# Patient Record
Sex: MALE | Employment: FULL TIME | URBAN - METROPOLITAN AREA
[De-identification: names, ages, dates, MRNs, and addresses within clinical notes are randomized per-mention and may not be internally consistent; named-entity substitution may affect disease eponyms.]

---

## 2019-11-19 ENCOUNTER — OFFICE VISIT (OUTPATIENT)
Dept: FAMILY MEDICINE | Facility: CLINIC | Age: 29
End: 2019-11-19
Payer: COMMERCIAL

## 2019-11-19 VITALS
DIASTOLIC BLOOD PRESSURE: 71 MMHG | HEART RATE: 72 BPM | BODY MASS INDEX: 24.1 KG/M2 | HEIGHT: 68 IN | WEIGHT: 159 LBS | SYSTOLIC BLOOD PRESSURE: 115 MMHG | TEMPERATURE: 98.4 F | OXYGEN SATURATION: 99 % | RESPIRATION RATE: 16 BRPM

## 2019-11-19 DIAGNOSIS — D22.9 MULTIPLE ATYPICAL SKIN MOLES: Primary | ICD-10-CM

## 2019-11-19 DIAGNOSIS — L30.9 ECZEMA, UNSPECIFIED TYPE: ICD-10-CM

## 2019-11-19 PROCEDURE — 99203 OFFICE O/P NEW LOW 30 MIN: CPT | Performed by: PHYSICIAN ASSISTANT

## 2019-11-19 RX ORDER — INFLUENZA A VIRUS A/VICTORIA/2454/2019 IVR-207 (H1N1) ANTIGEN (PROPIOLACTONE INACTIVATED), INFLUENZA A VIRUS A/HONG KONG/2671/2019 IVR-208 (H3N2) ANTIGEN (PROPIOLACTONE INACTIVATED), INFLUENZA B VIRUS B/VICTORIA/705/2018 BVR-11 ANTIGEN (PROPIOLACTONE INACTIVATED), INFLUENZA B VIRUS B/PHUKET/3073/2013 BVR-1B ANTIGEN (PROPIOLACTONE INACTIVATED) 15; 15; 15; 15 UG/.5ML; UG/.5ML; UG/.5ML; UG/.5ML
INJECTION, SUSPENSION INTRAMUSCULAR
COMMUNITY
Start: 2019-11-07 | End: 2023-12-15

## 2019-11-19 RX ORDER — TRIAMCINOLONE ACETONIDE 1 MG/G
CREAM TOPICAL 2 TIMES DAILY PRN
Qty: 30 G | Refills: 1 | Status: SHIPPED | OUTPATIENT
Start: 2019-11-19

## 2019-11-19 ASSESSMENT — MIFFLIN-ST. JEOR: SCORE: 1647.78

## 2019-11-19 NOTE — PROGRESS NOTES
"Subjective     Yadiel Al is a 29 year old male who presents to clinic today for the following health issues:    HPI   Concern - moles    Description:   Evaluate moles has multiple moles and birth marks  Unsure if anything has changed        Also concerned about dry patch behind right ear for several years.  Was given abx ointment many years ago w/o resolution.       Reviewed and updated as needed this visit by Provider         Review of Systems   ROS COMP: Constitutional, HEENT, cardiovascular, pulmonary, gi and gu systems are negative, except as otherwise noted.      Objective    /71 (BP Location: Right arm, Patient Position: Chair, Cuff Size: Adult Large)   Pulse 72   Temp 98.4  F (36.9  C) (Oral)   Resp 16   Ht 1.715 m (5' 7.5\")   Wt 72.1 kg (159 lb)   SpO2 99%   BMI 24.54 kg/m    Body mass index is 24.54 kg/m .  Physical Exam   GENERAL APPEARANCE: healthy, alert and no distress  RESP: lungs clear to auscultation - no rales, rhonchi or wheezes  CV: regular rates and rhythm, normal S1 S2, no S3 or S4 and no murmur, click or rub  SKIN: dry, cracked erythematous patch right posterior ear.  Cafe-au-lait macule mid back  8 mm oval variegated dark brown macule right hip  2, 3 mm irregular border dark brown macules  Poikiloderma             Assessment & Plan     1. Multiple atypical skin moles  Pt will call for appointment  - DERMATOLOGY REFERRAL    2. Eczema, unspecified type  emollients  - triamcinolone (KENALOG) 0.1 % external cream; Apply topically 2 times daily as needed for irritation  Dispense: 30 g; Refill: 1           No follow-ups on file.    Marie Andre PA-C  [facility]"

## 2019-12-09 ENCOUNTER — HEALTH MAINTENANCE LETTER (OUTPATIENT)
Age: 29
End: 2019-12-09

## 2020-01-15 ENCOUNTER — TRANSFERRED RECORDS (OUTPATIENT)
Dept: HEALTH INFORMATION MANAGEMENT | Facility: CLINIC | Age: 30
End: 2020-01-15

## 2020-03-03 ENCOUNTER — OFFICE VISIT (OUTPATIENT)
Dept: FAMILY MEDICINE | Facility: CLINIC | Age: 30
End: 2020-03-03
Payer: COMMERCIAL

## 2020-03-03 ENCOUNTER — ALLIED HEALTH/NURSE VISIT (OUTPATIENT)
Dept: FAMILY MEDICINE | Facility: CLINIC | Age: 30
End: 2020-03-03
Payer: COMMERCIAL

## 2020-03-03 VITALS
BODY MASS INDEX: 23.79 KG/M2 | HEIGHT: 68 IN | DIASTOLIC BLOOD PRESSURE: 70 MMHG | TEMPERATURE: 98.2 F | HEART RATE: 71 BPM | OXYGEN SATURATION: 98 % | WEIGHT: 157 LBS | SYSTOLIC BLOOD PRESSURE: 110 MMHG | RESPIRATION RATE: 16 BRPM

## 2020-03-03 DIAGNOSIS — S61.211A LACERATION OF LEFT INDEX FINGER WITHOUT FOREIGN BODY WITHOUT DAMAGE TO NAIL, INITIAL ENCOUNTER: Primary | ICD-10-CM

## 2020-03-03 DIAGNOSIS — S61.219A LACERATION OF FINGER: Primary | ICD-10-CM

## 2020-03-03 PROCEDURE — 12001 RPR S/N/AX/GEN/TRNK 2.5CM/<: CPT | Performed by: PHYSICIAN ASSISTANT

## 2020-03-03 PROCEDURE — 99207 ZZC NO CHARGE NURSE ONLY: CPT

## 2020-03-03 ASSESSMENT — MIFFLIN-ST. JEOR: SCORE: 1638.71

## 2020-03-03 NOTE — NURSING NOTE
"Yadiel Al;   Chief Complaint   Patient presents with     Laceration     this am left pointer finger while cutting bagel - bleeding controlled, last Tdap 2004, wound washed with water, Elio Bonilla PAC looked at wound and agreed to place sutures - pt placed on Elio B schedule      Initial There were no vitals taken for this visit. Estimated body mass index is 24.23 kg/m  as calculated from the following:    Height as of an earlier encounter on 3/3/20: 1.715 m (5' 7.5\").    Weight as of an earlier encounter on 3/3/20: 71.2 kg (157 lb)..  BP completed using cuff size NA (Not Taken).    Ame Cornell, Registered Nurse   [facility]   "

## 2020-03-03 NOTE — PATIENT INSTRUCTIONS
Patient Education     Hand Laceration: All Closures  A laceration is a cut through the skin. Deep cuts usually require stitches. Minor cuts may be closed with surgical tape or skin adhesive.   X-rays may be done if something may have entered the skin through the cut, such as broken glass. You may also be given a tetanus shot if you are not up to date on this vaccination and the object that cut you may carry tetanus.    Home care    Your healthcare provider may prescribe an antibiotic. This is to help prevent infection. Follow all instructions for taking this medicine. Take the medicine every day until it is gone or you are told to stop. You should not have any left over.    The healthcare provider may prescribe medicines for pain. Follow instructions for taking them.    Follow the healthcare provider s instructions on how to care for the cut.    Keep the wound clean and dry. Don't get the wound wet until you are told it is OK to do so. If the bandage gets wet, remove it. Gently pat the wound dry with a clean cloth. Then put on a clean, dry bandage.    To help prevent infection, wash your hands with soap and water before and after caring for the wound.     Caring for stiches: Once you no longer need to keep the stitches dry, clean the wound daily. First, remove the bandage. Then wash the area gently with soap and warm water, or as directed by the healthcare provider. Use a wet cotton swab to loosen and remove any blood or crust that forms. After cleaning, apply a thin layer of antibiotic ointment if advised. Then put on a new bandage unless you are told not to.    Caring for skin glue: Don t put apply liquid, ointment, or cream on the wound while the glue is in place. Avoid activities that cause heavy sweating. Protect the wound from sunlight. Don't scratch, rub, or pick at the adhesive film. Don't place tape directly over the film. The glue should peel off within 5 to 10 days.     Caring for surgical tape: Keep  the area dry. If it gets wet, blot it dry with a clean towel. Surgical tape usually falls off within 7 to 10 days. If it has not fallen off after 10 days, you can take it off yourself. Put mineral oil or petroleum jelly on a cotton ball and gently rub the tape until it is removed.    Once you can get the wound wet, you may shower as usual, but don't soak the wound in water. This means no tub baths or swimming.    Even with proper treatment, a wound infection may sometimes occur. Check the wound daily for signs of infection listed below.  Follow-up care  Follow up with your healthcare provider, or as advised. If you have stitches, be sure to return as directed to have them removed.  When to seek medical advice  Call your healthcare provider right away if any of these occur:    Wound bleeding not controlled by direct pressure    Signs of infection, including increasing pain in the wound, increasing wound redness or swelling, or pus or bad odor coming from the wound    Fever of 100.4 F (38. C) o higher, or as directed by your healthcare provider    Stitches come apart or fall out or surgical tape falls off before 7 days    Wound edges reopen    Wound changes colors    Numbness or weakness in the affected hand     Decreased movement of the hand  Date Last Reviewed: 7/1/2017 2000-2019 [employer]. [address] [place] [address], [place] [address]. All rights reserved. This information is not intended as a substitute for professional medical care. Always follow your healthcare professional's instructions.

## 2020-03-03 NOTE — PROGRESS NOTES
"Subjective     Yadiel Al is a 29 year old male who presents to clinic today for the following health issues:    HPI   Concern - cut on left pointed finger  Onset: 1 day    Description:   Pt says he was cutting bagel and accidentally cut his left pointed finger.  SUBJECTIVE:   29 year old male sustained laceration of left index finger 2 hours ago. Nature of injury: cut with knife cutting bagel. Tetanus vaccination status reviewed: last tetanus booster 16 years ago.      There is no problem list on file for this patient.    No past surgical history on file.    Social History     Tobacco Use     Smoking status: Never Smoker     Smokeless tobacco: Never Used   Substance Use Topics     Alcohol use: Yes     Comment: socially     Family History   Problem Relation Age of Onset     Colon Cancer Paternal Aunt          Current Outpatient Medications   Medication Sig Dispense Refill     AFLURIA QUADRIVALENT 0.5 ML injection        triamcinolone (KENALOG) 0.1 % external cream Apply topically 2 times daily as needed for irritation 30 g 1     No Known Allergies  BP Readings from Last 3 Encounters:   03/03/20 110/70   11/19/19 115/71    Wt Readings from Last 3 Encounters:   03/03/20 71.2 kg (157 lb)   11/19/19 72.1 kg (159 lb)                    Reviewed and updated as needed this visit by Provider       Review Of Systems  Skin: as above   Eyes: negative  Ears/Nose/Throat: negative  Respiratory: No shortness of breath, dyspnea on exertion, cough, or hemoptysis  Cardiovascular: negative  Gastrointestinal: negative  Genitourinary: negative  Musculoskeletal: negative  Neurologic: negative  Psychiatric: negative  Hematologic/Lymphatic/Immunologic: negative  Endocrine: negative    Objective  /70 (BP Location: Right arm, Patient Position: Chair, Cuff Size: Adult Large)   Pulse 71   Temp 98.2  F (36.8  C) (Oral)   Resp 16   Ht 1.715 m (5' 7.5\")   Wt 71.2 kg (157 lb)   SpO2 98%   BMI 24.23 kg/m    Patient appears well, " vitals are normal. Laceration 2 cm noted.  Description: clean wound edges, no foreign bodies. Neurovascular and tendon structures are intact.    ASSESSMENT:   Laceration as described.    PLAN:   Anesthesia with Lidocaine 1% plain. Wound cleansed, debrided of visible foreign material and necrotic tissue, and sutured with 3 simple interrupted 4.0 Ethilon sutures. Dressing applied.  Wound care instructions provided.  Observe for any signs of infection or other problems.  Return for suture removal in 7 days.        (C13.533I) Laceration of left index finger without foreign body without damage to nail, initial encounter  (primary encounter diagnosis)  Comment: as above. Of note, patient left clinic prior to TDAP updated. Though low risk given NABEEL, recommending updating. Call out to patient to return to clinic to have this updated.   Plan: REPAIR SUPERFICIAL, WOUND BODY < =2.5CM    Elio Bonilla, pac

## 2020-03-10 ENCOUNTER — ALLIED HEALTH/NURSE VISIT (OUTPATIENT)
Dept: FAMILY MEDICINE | Facility: CLINIC | Age: 30
End: 2020-03-10
Payer: COMMERCIAL

## 2020-03-10 DIAGNOSIS — S61.219A LACERATION OF FINGER: Primary | ICD-10-CM

## 2020-03-10 DIAGNOSIS — Z48.02 VISIT FOR SUTURE REMOVAL: ICD-10-CM

## 2020-03-10 PROCEDURE — 99207 ZZC NO CHARGE NURSE ONLY: CPT

## 2020-03-10 PROCEDURE — 90471 IMMUNIZATION ADMIN: CPT

## 2020-03-10 PROCEDURE — 90715 TDAP VACCINE 7 YRS/> IM: CPT

## 2020-03-10 NOTE — NURSING NOTE
Yadiel Al;   Chief Complaint   Patient presents with     Suture Removal     patient had 3 sutures placed on 3/3/2020 - left index finger by Elio COLLIER, to be removed 7 days which is today 3/10/2020       Yadiel Al presents to the clinic today for removal of sutures.  The patient has had the sutures in place for 7 days.  There has been no history of infection or drainage.  3 sutures are seen located on the left index finger.  The wound is healing well with no signs of infection.  Tetanus status is NOT up to date RN huddled with Haase, Susan Rachele APRN CNP and verbal okay to order and give Tdap.     All sutures were easily removed today.  Routine wound care discussed.  The patient will follow up as needed.    BP completed using cuff size NA (Not Taken).  Ame Cornell, Registered Nurse   [facility]

## 2021-01-14 ENCOUNTER — HEALTH MAINTENANCE LETTER (OUTPATIENT)
Age: 31
End: 2021-01-14

## 2021-10-24 ENCOUNTER — HEALTH MAINTENANCE LETTER (OUTPATIENT)
Age: 31
End: 2021-10-24

## 2022-02-12 ENCOUNTER — HEALTH MAINTENANCE LETTER (OUTPATIENT)
Age: 32
End: 2022-02-12

## 2022-10-10 ENCOUNTER — HEALTH MAINTENANCE LETTER (OUTPATIENT)
Age: 32
End: 2022-10-10

## 2023-03-25 ENCOUNTER — HEALTH MAINTENANCE LETTER (OUTPATIENT)
Age: 33
End: 2023-03-25

## 2023-12-15 ENCOUNTER — VIRTUAL VISIT (OUTPATIENT)
Dept: FAMILY MEDICINE | Facility: OTHER | Age: 33
End: 2023-12-15
Payer: COMMERCIAL

## 2023-12-15 DIAGNOSIS — R05.1 ACUTE COUGH: Primary | ICD-10-CM

## 2023-12-15 DIAGNOSIS — H10.32 ACUTE CONJUNCTIVITIS OF LEFT EYE, UNSPECIFIED ACUTE CONJUNCTIVITIS TYPE: ICD-10-CM

## 2023-12-15 PROCEDURE — 99203 OFFICE O/P NEW LOW 30 MIN: CPT | Mod: 95 | Performed by: PHYSICIAN ASSISTANT

## 2023-12-15 RX ORDER — POLYMYXIN B SULFATE AND TRIMETHOPRIM 1; 10000 MG/ML; [USP'U]/ML
1-2 SOLUTION OPHTHALMIC EVERY 4 HOURS
Qty: 10 ML | Refills: 0 | Status: SHIPPED | OUTPATIENT
Start: 2023-12-15 | End: 2023-12-20

## 2023-12-15 RX ORDER — BENZONATATE 200 MG/1
200 CAPSULE ORAL 3 TIMES DAILY PRN
Qty: 30 CAPSULE | Refills: 0 | Status: SHIPPED | OUTPATIENT
Start: 2023-12-15

## 2023-12-15 NOTE — PROGRESS NOTES
"    Instructions Relayed to Patient by Virtual Roomer:     Patient is active on [employer]:   Relayed following to patient: \"It looks like you are active on [employer], are you able to join the visit this way? If not, do you need us to send you a link now or would you like your provider to send a link via text or email when they are ready to initiate the visit?\"    Reminded patient to ensure they were logged on to virtual visit by arrival time listed. Documented in appointment notes if patient had flexibility to initiate visit sooner than arrival time. If pediatric virtual visit, ensured pediatric patient along with parent/guardian will be present for video visit.     Patient offered the website [email or web address] and/or phone number to [employer] Help line: [phone]    Yadiel is a 33 year old who is being evaluated via a billable video visit.      How would you like to obtain your AVS? [employer]  If the video visit is dropped, the invitation should be resent by: Text to cell phone: [phone]  Will anyone else be joining your video visit? No      Assessment & Plan     Acute cough  Sounds consistent with post infection cough, the other symptoms have resolved, cough mainly in AM and PM.  Will treat with benzonatate as needed.  Keep hydrated, cough drops as needed. Discussed can expect resolution over the next few weeks. If not or at any point it is getting worse or new symptoms then he should be evaluated again in person.   - benzonatate (TESSALON) 200 MG capsule; Take 1 capsule (200 mg) by mouth 3 times daily as needed for cough    Acute conjunctivitis of left eye, unspecified acute conjunctivitis type  Suspect part of the viral process. Encouraged fluids.  Will try drop for the next 3-5 days to see if this helps. Good handwashing.  Warm and cool compresses as needed.   - polymixin b-trimethoprim (POLYTRIM) 51430-1.1 UNIT/ML-% ophthalmic solution; Place 1-2 drops Into the left eye every 4 hours for 5 " days    Options for treatment and follow-up care were reviewed with the patient and/or guardian. Patient and/or guardian engaged in the decision making process and verbalized understanding of the options discussed and agreed with the final plan.      Polo Bradley PA-C  [facility] ESTEFANI Cotto is a 33 year old, presenting for the following health issues:  No chief complaint on file.        12/15/2023     2:54 PM   Additional Questions   Roomed by carl   Accompanied by self       History of Present Illness       Reason for visit:  Advice on Cough  Symptom onset:  More than a month  Symptoms include:  Lingering Cough (1 month) and Blurry Morning Eye(1 week)  Symptom intensity:  Moderate  Symptom progression:  Staying the same  Had these symptoms before:  Yes  Has tried/received treatment for these symptoms:  No  What makes it worse:  Morning is the worst  What makes it better:  Cough Drops    He eats 4 or more servings of fruits and vegetables daily.He consumes 1 sweetened beverage(s) daily.He exercises with enough effort to increase his heart rate 10 to 19 minutes per day.  He exercises with enough effort to increase his heart rate 3 or less days per week.   He is taking medications regularly.   Pt says when he wakes up his left eye is really blurry and previously his right was sore but has no other cold symptoms  - he has had a cough and cold about a month ago but now having lingering cough in the AM and at bedtime.   - Has some eye blurriness in the left eye in the morning, it will last just in the AM, he'll rub it a bit and wipe it and it'll get better.  Doesn't seem like there is much for discharge.   - no redness or swelling of the eye.   - Seems like cough has plateaued  - using cough drops.   - No shortness of breath, chest pain, wheezing, palpitations.   - No hx of lung diseases.   -  provider did have pneumonia.     Review of Systems   Constitutional, HEENT,  cardiovascular, pulmonary, gi and gu systems are negative, except as otherwise noted.      Objective           Vitals:  No vitals were obtained today due to virtual visit.    Physical Exam   GENERAL: Healthy, alert and no distress  EYES: Eyes grossly normal to inspection.  No discharge or erythema, or obvious scleral/conjunctival abnormalities.  RESP: No audible wheeze, cough, or visible cyanosis.  No visible retractions or increased work of breathing.    SKIN: Visible skin clear. No significant rash, abnormal pigmentation or lesions.  NEURO: Cranial nerves grossly intact.  Mentation and speech appropriate for age.  PSYCH: Mentation appears normal, affect normal/bright, judgement and insight intact, normal speech and appearance well-groomed.            Video-Visit Details    Type of service:  Video Visit     Originating Location (pt. Location): Other Vehicle    Distant Location (provider location):  Off-site  Platform used for Video Visit: [employer]

## 2024-05-26 ENCOUNTER — HEALTH MAINTENANCE LETTER (OUTPATIENT)
Age: 34
End: 2024-05-26

## 2024-07-29 ENCOUNTER — TRANSFERRED RECORDS (OUTPATIENT)
Dept: HEALTH INFORMATION MANAGEMENT | Facility: CLINIC | Age: 34
End: 2024-07-29
Payer: COMMERCIAL

## 2025-06-14 ENCOUNTER — HEALTH MAINTENANCE LETTER (OUTPATIENT)
Age: 35
End: 2025-06-14